# Patient Record
Sex: FEMALE | Race: WHITE | URBAN - METROPOLITAN AREA
[De-identification: names, ages, dates, MRNs, and addresses within clinical notes are randomized per-mention and may not be internally consistent; named-entity substitution may affect disease eponyms.]

---

## 2023-11-28 ENCOUNTER — APPOINTMENT (RX ONLY)
Dept: URBAN - METROPOLITAN AREA CLINIC 12 | Facility: CLINIC | Age: 23
Setting detail: DERMATOLOGY
End: 2023-11-28

## 2023-12-05 ENCOUNTER — APPOINTMENT (RX ONLY)
Dept: URBAN - METROPOLITAN AREA OTHER 5 | Facility: OTHER | Age: 23
Setting detail: DERMATOLOGY
End: 2023-12-05

## 2023-12-05 DIAGNOSIS — Z41.1 ENCOUNTER FOR COSMETIC SURGERY: ICD-10-CM

## 2023-12-05 PROCEDURE — ? JUVEDERM VOLBELLA INJECTION

## 2023-12-05 PROCEDURE — ? CONSULTATION - AGING FACE

## 2023-12-05 PROCEDURE — ? RESTYLANE KYSSE INJECTION

## 2023-12-05 ASSESSMENT — LOCATION DETAILED DESCRIPTION DERM
LOCATION DETAILED: LEFT CENTRAL MALAR CHEEK
LOCATION DETAILED: LEFT INFERIOR VERMILION LIP
LOCATION DETAILED: RIGHT INFERIOR VERMILION LIP
LOCATION DETAILED: RIGHT SUPERIOR VERMILION LIP
LOCATION DETAILED: LEFT SUPERIOR VERMILION LIP

## 2023-12-05 ASSESSMENT — LOCATION ZONE DERM
LOCATION ZONE: LIP
LOCATION ZONE: FACE

## 2023-12-05 ASSESSMENT — LOCATION SIMPLE DESCRIPTION DERM
LOCATION SIMPLE: RIGHT LIP
LOCATION SIMPLE: LEFT LIP
LOCATION SIMPLE: LEFT CHEEK

## 2023-12-05 NOTE — PROCEDURE: RESTYLANE KYSSE INJECTION
Map Statement: See Attached Map for Complete Details
Vermilion Lips Filler Volume In Cc: 0.6
Tear Troughs Filler Volume In Cc: 0
Include Cannula Information In Note?: No
Detail Level: Detailed
Post-Care Instructions: Patient instructed to apply ice to reduce swelling.
Price (Use Numbers Only, No Special Characters Or $): 487
Filler: Restylane Kysse
Consent: Written consent obtained. Risks include but not limited to bruising, beading, irregular texture, ulceration, infection, allergic reaction, scar formation, incomplete augmentation, temporary nature, procedural pain.
Topical Anesthesia?: 20% benzocaine, 7% lidocaine, and 7% tetracaine
Procedural Text: The filler was administered to the treatment areas noted above.
Number Of Syringes (Required For Inventory): 1

## 2023-12-05 NOTE — PROCEDURE: JUVEDERM VOLBELLA INJECTION
Cheeks Filler Volume In Cc: 0
Detail Level: Detailed
Include Cannula Information In Note?: No
Lot #: [id number]
Consent: Written consent obtained. Risks include but not limited to bruising, beading, irregular texture, ulceration, infection, allergic reaction, scar formation, incomplete augmentation, temporary nature, procedural pain.
Number Of Syringes (Required For Inventory): 1
Procedural Text: The filler was administered to the treatment areas noted above.
Additional Anesthesia Volume In Cc: 6
Anesthesia Type: 1% lidocaine with epinephrine
Price (Use Numbers Only, No Special Characters Or $): 250.00
Filler: Juvederm Volbella XC
Expiration Date (Month Year): 11/03/2024
Map Statment: See Attach Map for Complete Details
Anesthesia Volume In Cc: 0.5
Post-Care Instructions: Patient instructed to apply ice to reduce swelling.

## 2024-04-19 ENCOUNTER — APPOINTMENT (RX ONLY)
Dept: URBAN - METROPOLITAN AREA CLINIC 12 | Facility: CLINIC | Age: 24
Setting detail: DERMATOLOGY
End: 2024-04-19

## 2024-04-19 DIAGNOSIS — Z41.1 ENCOUNTER FOR COSMETIC SURGERY: ICD-10-CM

## 2024-04-19 PROCEDURE — ? RESTYLANE KYSSE INJECTION

## 2024-04-19 ASSESSMENT — LOCATION DETAILED DESCRIPTION DERM
LOCATION DETAILED: LEFT INFERIOR VERMILION LIP
LOCATION DETAILED: RIGHT SUPERIOR VERMILION LIP
LOCATION DETAILED: LEFT SUPERIOR VERMILION LIP
LOCATION DETAILED: RIGHT INFERIOR VERMILION LIP

## 2024-04-19 ASSESSMENT — LOCATION SIMPLE DESCRIPTION DERM
LOCATION SIMPLE: RIGHT LIP
LOCATION SIMPLE: LEFT LIP

## 2024-04-19 ASSESSMENT — LOCATION ZONE DERM: LOCATION ZONE: LIP

## 2024-04-19 NOTE — PROCEDURE: RESTYLANE KYSSE INJECTION
Map Statement: See Attached Map for Complete Details
Vermilion Lips Filler Volume In Cc: 0.6
Tear Troughs Filler Volume In Cc: 0
Include Cannula Information In Note?: No
Detail Level: Detailed
Post-Care Instructions: Patient instructed to apply ice to reduce swelling.
Price (Use Numbers Only, No Special Characters Or $): 269
Filler: Restylane Kysse
Consent: Written consent obtained. Risks include but not limited to bruising, beading, irregular texture, ulceration, infection, allergic reaction, scar formation, incomplete augmentation, temporary nature, procedural pain.
Topical Anesthesia?: 20% benzocaine, 7% lidocaine, and 7% tetracaine
Procedural Text: The filler was administered to the treatment areas noted above.
Number Of Syringes (Required For Inventory): 1